# Patient Record
(demographics unavailable — no encounter records)

---

## 2025-05-05 NOTE — ASSESSMENT
[FreeTextEntry1] : 47 year F with bilateral knee pain. Left knee hx of lateral meniscectomy. now with flexion contractures bilateral knees update bilateral knee MRIs and fu after completion consideration PT, CSI, nerve blocks  2024 failed conservative tx. MRI with lateral meniscal tearing on bilateral knees Patient will fu with Dr. Hernandez for genicular n. block consideration. She will also talk to sports re: whether an arthroscopy can be beneficial.   2025: Continued knee pain Will follow up with Dr. Rodriguez to see if candidate for possible arthroscopy    Time Based billin minutes was spent with the patient today taking the patient's history, conducting a physical examination, reviewing imaging studies, and  detailed discussion regarding the diagnosis and treatment plan.

## 2025-05-05 NOTE — IMAGING
[de-identified] : BILATERAL KNEE EXAM Alignment: Neutral  Effusion: None Atrophy: None                                                  Stable to Varus/valgus stress Posterior Drawer Test: negative Anterior Drawer Test: Negative Knee Extension/Flexion: 60 / 80  Medial/lateral compartments Medial joint line: POS Tenderness Lateral joint line: POS Tenderness Salty test: negative  Patellofemoral joint Medial patellar facet: no tenderness Patellar grind: Negative  Tendons: Pes Anserine: No tenderness Gerdys Tubercle/ IT Band: No tenderness Quadriceps Tendon: No Tenderness patellar tendon: no Tenderness Tibial tubercle: not tenderness Calf: no Tenderness  Neurovascular exam Muscle strength: 5/5 Sensation to light touch: intact Distal pulses: 2+  MRI L knee:  Postop changes of lateral meniscus, no recurrent tear Full thickness cartialge fissues over central lateral tibia Arthrosis of tibiofibular joint  Patella mateo with lateral subluxation  MRI Right Knee: Patella mateo with lateral subluxation No meniscal tear Cartilage over lateral and medial joint is preserved  4/24/24 MRI results LEFT KNEE - lateral meniscus tear, nondepressed subchondral fx, stable arthrosis and small loose body RIGHT KNEE -  lateral meniscal tear and with stable cartilage fissuring central lateral tibia. medial patella cartilage loss, joint effusion with stable hamstring/gastroc fraying

## 2025-05-05 NOTE — HISTORY OF PRESENT ILLNESS
[] : yes [de-identified] : WC DOI 7/25/2018 03/25/2024: STACIE TRENT is a 47 year old female complaining of BILATERAL knee pain since 07/25/18 after falling at work. saw Elda (last seen 10/25/23). had Euflexxa for b/l knee (#3 04/12/23). intermittent buckling of knees. pain increasing since last visit with Elda. h/o L knee sx 3-4 years ago. Has had flexion contracture since then. no relief with gel injections  04/29/2024:  STACIE TRENT is a 47 year y/o F here for MRI results. states she has gotten any better. pt complains of burning sensation and numbness radiating to the toes. going on for 3 months.   5/5/25 patient here for follow up. states no changes since last visit. reports her right knee is getting worse and feeling like tendons tearing when standing up. still having the burning sensation and radiating pain to the toes [de-identified] : 10/25/23 [de-identified] : TE[facility]ORO [de-identified] : MRI (08/22/22)